# Patient Record
Sex: MALE | Race: WHITE
[De-identification: names, ages, dates, MRNs, and addresses within clinical notes are randomized per-mention and may not be internally consistent; named-entity substitution may affect disease eponyms.]

---

## 2018-11-23 ENCOUNTER — HOSPITAL ENCOUNTER (EMERGENCY)
Dept: HOSPITAL 76 - ED | Age: 33
Discharge: HOME | End: 2018-11-23
Payer: COMMERCIAL

## 2018-11-23 VITALS — SYSTOLIC BLOOD PRESSURE: 138 MMHG | DIASTOLIC BLOOD PRESSURE: 88 MMHG

## 2018-11-23 DIAGNOSIS — K64.5: Primary | ICD-10-CM

## 2018-11-23 DIAGNOSIS — R03.0: ICD-10-CM

## 2018-11-23 PROCEDURE — 99283 EMERGENCY DEPT VISIT LOW MDM: CPT

## 2018-11-23 PROCEDURE — 46083 INC THROMBOSED HROID XTRNL: CPT

## 2018-11-23 NOTE — ED PHYSICIAN DOCUMENTATION
History of Present Illness





- Stated complaint


Stated Complaint: MALE 





- Chief complaint


Chief Complaint: General





- History obtained from


History obtained from: Patient





- History of Present Illness


Timing: Other (Painful hemorrhoid on the left side for about a week which popped

a couple of days ago with blood but still is pretty painful.  He has had 

occasional hemorrhoids before.)





Review of Systems


Constitutional: reports: Reviewed and negative


Throat: reports: Reviewed and negative


Cardiac: reports: Reviewed and negative


Respiratory: reports: Reviewed and negative





PD PAST MEDICAL HISTORY





- Past Medical History


Past Medical History: No





- Past Surgical History


Past Surgical History: No





- Present Medications


Home Medications: 


                                Ambulatory Orders











 Medication  Instructions  Recorded  Confirmed


 


Docusate Sodium 250Mg Capsule 250 mg PO DAILY #30 capsule 11/23/18 





[Colace 250Mg Capsule]   


 


Hydrocodone/Acetaminophen 1 - 2 each PO Q6H PRN #7 tablet 11/23/18 





[Hydrocodon-Acetaminophen 5-325]   


 


Hydrocortisone Acetate [Anucort-Hc] 25 mg RC TID #20 supp.rect 11/23/18 














- Allergies


Allergies/Adverse Reactions: 


                                    Allergies











Allergy/AdvReac Type Severity Reaction Status Date / Time


 


No Known Drug Allergies Allergy   Verified 11/23/18 11:17














- Social History


Does the pt smoke?: No


Smoking Status: Never smoker


Does the pt drink ETOH?: Yes


ETOH Use: Beer, Liquor


Substance Use and Type: Marijuana





- POLST


Patient has POLST: No





PD ED PE NORMAL





- Vitals


Vital signs reviewed: Yes





- General


General: Alert and oriented X 3, No acute distress





- Abdomen


Abdomen: Normal bowel sounds, Soft, Non tender





- Male 


Male : Other (On the left side of the rectum there is a large thrombosed 

hemorrhoid that actually has an overlying defect in the tissue but the clot is 

still in place.)





- Neuro


Neuro: Alert and oriented X 3, Normal speech





- Psych


Psych: Normal mood, Normal affect





Results





- Vitals


Vitals: 


                               Vital Signs - 24 hr











  11/23/18





  11:14


 


Temperature 36 C L


 


Heart Rate 82


 


Respiratory 18





Rate 


 


Blood Pressure 138/88 H


 


O2 Saturation 100








                                     Oxygen











O2 Source                      Room air

















Procedures





- General procedure


General procedure: 





After verbal informed consents the base of the hemorrhoid was anesthetized with 

2% lidocaine with epinephrine and some time past for excellent anesthesia and 

then a teardrop shaped incision was made and the clot was completely expressed 

with good effect.





Departure





- Departure


Disposition: 01 Home, Self Care


Clinical Impression: 


 Thrombosed hemorrhoids





Condition: Good


Record reviewed to determine appropriate education?: Yes


Instructions:  ED Hemorrhoids


Follow-Up: 


Neptali Lu MD [Provider Admit Priv/Credential] - 


Prescriptions: 


Docusate Sodium 250Mg Capsule [Colace 250Mg Capsule] 250 mg PO DAILY #30 capsule


Hydrocodone/Acetaminophen [Hydrocodon-Acetaminophen 5-325] 1 - 2 each PO Q6H PRN

#7 tablet


 PRN Reason: pain


Hydrocortisone Acetate [Anucort-Hc] 25 mg RC TID #20 supp.rect


Comments: 


Your blood pressure was elevated today on check into the emergency department.  

This does not mean that you have hypertension, it is a common phenomenon to come

to the emergency department and have elevated blood pressure.  I recommend that 

you see your primary care physician within the week to have it rechecked when 

you are feeling better.


Forms:  Activity restrictions